# Patient Record
Sex: FEMALE
[De-identification: names, ages, dates, MRNs, and addresses within clinical notes are randomized per-mention and may not be internally consistent; named-entity substitution may affect disease eponyms.]

---

## 2021-04-20 ENCOUNTER — OFFICE VISIT (OUTPATIENT)
Age: 46
End: 2021-04-20

## 2022-06-09 ENCOUNTER — OFFICE VISIT (OUTPATIENT)
Dept: URBAN - METROPOLITAN AREA CLINIC 7 | Facility: CLINIC | Age: 47
End: 2022-06-09

## 2022-06-15 ENCOUNTER — TELEPHONE ENCOUNTER (OUTPATIENT)
Dept: URBAN - METROPOLITAN AREA CLINIC 9 | Facility: CLINIC | Age: 47
End: 2022-06-15

## 2022-06-27 ENCOUNTER — TELEPHONE ENCOUNTER (OUTPATIENT)
Dept: URBAN - METROPOLITAN AREA CLINIC 9 | Facility: CLINIC | Age: 47
End: 2022-06-27

## 2022-07-21 ENCOUNTER — TELEPHONE ENCOUNTER (OUTPATIENT)
Dept: URBAN - METROPOLITAN AREA CLINIC 9 | Facility: CLINIC | Age: 47
End: 2022-07-21

## 2022-07-30 ENCOUNTER — TELEPHONE ENCOUNTER (OUTPATIENT)
Age: 47
End: 2022-07-30

## 2022-07-31 ENCOUNTER — TELEPHONE ENCOUNTER (OUTPATIENT)
Age: 47
End: 2022-07-31

## 2022-11-15 ENCOUNTER — TELEPHONE ENCOUNTER (OUTPATIENT)
Dept: URBAN - METROPOLITAN AREA CLINIC 7 | Facility: CLINIC | Age: 47
End: 2022-11-15

## 2022-12-09 ENCOUNTER — OFFICE VISIT (OUTPATIENT)
Dept: URBAN - METROPOLITAN AREA CLINIC 7 | Facility: CLINIC | Age: 47
End: 2022-12-09

## 2022-12-14 ENCOUNTER — OFFICE VISIT (OUTPATIENT)
Dept: URBAN - METROPOLITAN AREA CLINIC 7 | Facility: CLINIC | Age: 47
End: 2022-12-14

## 2023-01-09 ENCOUNTER — LAB OUTSIDE AN ENCOUNTER (OUTPATIENT)
Dept: URBAN - METROPOLITAN AREA CLINIC 7 | Facility: CLINIC | Age: 48
End: 2023-01-09

## 2023-01-09 ENCOUNTER — OFFICE VISIT (OUTPATIENT)
Dept: URBAN - METROPOLITAN AREA CLINIC 7 | Facility: CLINIC | Age: 48
End: 2023-01-09
Payer: COMMERCIAL

## 2023-01-09 VITALS
WEIGHT: 265 LBS | DIASTOLIC BLOOD PRESSURE: 84 MMHG | SYSTOLIC BLOOD PRESSURE: 130 MMHG | BODY MASS INDEX: 44.15 KG/M2 | TEMPERATURE: 98 F | HEIGHT: 65 IN

## 2023-01-09 DIAGNOSIS — Z12.11 COLON CANCER SCREENING: ICD-10-CM

## 2023-01-09 DIAGNOSIS — K76.0 HEPATIC STEATOSIS: ICD-10-CM

## 2023-01-09 DIAGNOSIS — N81.10 BLADDER PROLAPSE, FEMALE, ACQUIRED: ICD-10-CM

## 2023-01-09 PROCEDURE — 99214 OFFICE O/P EST MOD 30 MIN: CPT | Performed by: INTERNAL MEDICINE

## 2023-01-09 NOTE — HPI-TODAY'S VISIT:
Hx  hepatic steatosis. LFTs within normal level,elevated. No risks for chronic viral hepatitis. No significant alcohol use history. No history  medication use  associated with steatosis including:amiodarone,methotrexate,tamoxifen,valproic acid ,glucocorticoids. Metabolic syndrome risk factors include elevated BMI but no impaired glycemic control/diabetes,hypertension or  hyperlipidemia. No lab,imaging or exam findings to suggest portal hypertension. No family history of liver disease. No prior liver biopsy.  Longstanding hx of gi sxs and loose BMs Negative prior eval out of state for this. Average CRC risk and due for screening colonoscopy. s/p recent surgery august with bladder sling and recotocele repair.Reports that had recent gyne fu and initiated on premarin cream to assist with suture dissolution.Has fu in a few weeks. No cardiopulmonary comorbidities . No anticoagulation. No recent nsaid use history. No fevers or chills No nausea or vomiting No weight loss No change in bowel habits No bleeding.

## 2023-02-21 PROBLEM — 305058001: Status: ACTIVE | Noted: 2023-01-09

## 2023-07-10 ENCOUNTER — TELEPHONE ENCOUNTER (OUTPATIENT)
Dept: URBAN - METROPOLITAN AREA CLINIC 7 | Facility: CLINIC | Age: 48
End: 2023-07-10

## 2023-08-18 ENCOUNTER — OFFICE VISIT (OUTPATIENT)
Dept: URBAN - METROPOLITAN AREA SURGERY CENTER 5 | Facility: SURGERY CENTER | Age: 48
End: 2023-08-18

## 2023-11-13 ENCOUNTER — TELEPHONE ENCOUNTER (OUTPATIENT)
Dept: URBAN - METROPOLITAN AREA CLINIC 7 | Facility: CLINIC | Age: 48
End: 2023-11-13

## 2023-11-15 ENCOUNTER — OFFICE VISIT (OUTPATIENT)
Dept: URBAN - METROPOLITAN AREA CLINIC 7 | Facility: CLINIC | Age: 48
End: 2023-11-15

## 2023-12-19 ENCOUNTER — TELEPHONE ENCOUNTER (OUTPATIENT)
Dept: URBAN - METROPOLITAN AREA CLINIC 7 | Facility: CLINIC | Age: 48
End: 2023-12-19

## 2023-12-19 ENCOUNTER — DASHBOARD ENCOUNTERS (OUTPATIENT)
Age: 48
End: 2023-12-19

## 2023-12-19 ENCOUNTER — OFFICE VISIT (OUTPATIENT)
Dept: URBAN - METROPOLITAN AREA CLINIC 7 | Facility: CLINIC | Age: 48
End: 2023-12-19
Payer: COMMERCIAL

## 2023-12-19 ENCOUNTER — LAB OUTSIDE AN ENCOUNTER (OUTPATIENT)
Dept: URBAN - METROPOLITAN AREA CLINIC 7 | Facility: CLINIC | Age: 48
End: 2023-12-19

## 2023-12-19 VITALS
BODY MASS INDEX: 43.32 KG/M2 | SYSTOLIC BLOOD PRESSURE: 126 MMHG | WEIGHT: 260 LBS | HEIGHT: 65 IN | TEMPERATURE: 98 F | DIASTOLIC BLOOD PRESSURE: 78 MMHG

## 2023-12-19 DIAGNOSIS — K62.3 RECTAL PROLAPSE: ICD-10-CM

## 2023-12-19 DIAGNOSIS — N81.10 BLADDER PROLAPSE, FEMALE, ACQUIRED: ICD-10-CM

## 2023-12-19 DIAGNOSIS — R79.89 ELEVATED LFTS: ICD-10-CM

## 2023-12-19 DIAGNOSIS — K76.0 HEPATIC STEATOSIS: ICD-10-CM

## 2023-12-19 PROCEDURE — 99214 OFFICE O/P EST MOD 30 MIN: CPT | Performed by: INTERNAL MEDICINE

## 2023-12-19 RX ORDER — CONJUGATED ESTROGENS 0.62 MG/G
CREAM VAGINAL
Qty: 30 GRAM | Status: ON HOLD | COMMUNITY

## 2023-12-19 RX ORDER — CONJUGATED ESTROGENS 0.62 MG/G
INSERT 1 GRAM VAGINALLY THREE TIMES WEEKLY AT BEDTIME CREAM VAGINAL
Qty: 30 UNSPECIFIED | Refills: 3 | Status: ACTIVE | COMMUNITY

## 2023-12-19 RX ORDER — GUAIFENESIN/PHENYLPROPANOLAMIN
AS DIRECTED EXPECTORANT ORAL
Status: ACTIVE | COMMUNITY

## 2023-12-19 RX ORDER — AMOXICILLIN 500 MG/1
TAKE ONE CAPSULE BY MOUTH EVERY 8 HOURS CAPSULE ORAL
Qty: 21 UNSPECIFIED | Refills: 0 | Status: ON HOLD | COMMUNITY

## 2023-12-19 NOTE — HPI-TODAY'S VISIT:
Seen back now s/p repeat fibroscan S3FO Has modifed diet and eliminated simple CHO. 10lb weight loss. [facility]. Hx  hepatic steatosis. LFTs within normal level,elevated. No risks for chronic viral hepatitis. No significant alcohol use history. No history  medication use  associated with steatosis including:amiodarone,methotrexate,tamoxifen,valproic acid ,glucocorticoids. Metabolic syndrome risk factors include elevated BMI but no impaired glycemic control/diabetes,hypertension or  hyperlipidemia. No lab,imaging or exam findings to suggest portal hypertension. No family history of liver disease. No prior liver biopsy.  Longstanding hx of gi sxs and loose BMs Negative prior eval out of state for this. Average CRC risk and due for screening colonoscopy. s/p recent surgery august with bladder sling and recotocele repair.Reports that had recent gyne fu and initiated on premarin cream to assist with suture dissolution.Has fu in a few weeks. No cardiopulmonary comorbidities . No anticoagulation. No recent nsaid use history. No fevers or chills No nausea or vomiting No weight loss No change in bowel habits No bleeding.

## 2024-04-03 ENCOUNTER — LAB (OUTPATIENT)
Dept: URBAN - METROPOLITAN AREA CLINIC 7 | Facility: CLINIC | Age: 49
End: 2024-04-03

## 2024-04-22 ENCOUNTER — COLON (OUTPATIENT)
Dept: URBAN - METROPOLITAN AREA SURGERY CENTER 5 | Facility: SURGERY CENTER | Age: 49
End: 2024-04-22
Payer: COMMERCIAL

## 2024-04-22 DIAGNOSIS — Z12.11 ENCOUNTER FOR SCREENING FOR MALIGNANT NEOPLASM OF COLON: ICD-10-CM

## 2024-04-22 DIAGNOSIS — D17.5 BENIGN LIPOMATOUS NEOPLASM OF INTRA-ABDOMINAL ORGANS: ICD-10-CM

## 2024-04-22 PROCEDURE — G0121 COLON CA SCRN NOT HI RSK IND: HCPCS | Performed by: INTERNAL MEDICINE

## 2025-07-10 ENCOUNTER — OFFICE VISIT (OUTPATIENT)
Dept: URBAN - METROPOLITAN AREA CLINIC 7 | Facility: CLINIC | Age: 50
End: 2025-07-10
Payer: COMMERCIAL

## 2025-07-10 DIAGNOSIS — K76.0 HEPATIC STEATOSIS: ICD-10-CM

## 2025-07-10 DIAGNOSIS — R79.89 ELEVATED LFTS: ICD-10-CM

## 2025-07-10 DIAGNOSIS — N81.10 BLADDER PROLAPSE, FEMALE, ACQUIRED: ICD-10-CM

## 2025-07-10 DIAGNOSIS — R68.81 EARLY SATIETY: ICD-10-CM

## 2025-07-10 DIAGNOSIS — Z86.0100 PERSONAL HISTORY OF COLONIC POLYPS: ICD-10-CM

## 2025-07-10 DIAGNOSIS — K62.3 RECTAL PROLAPSE: ICD-10-CM

## 2025-07-10 DIAGNOSIS — R19.5 LOOSE STOOLS: ICD-10-CM

## 2025-07-10 DIAGNOSIS — R63.5 WEIGHT GAIN: ICD-10-CM

## 2025-07-10 DIAGNOSIS — R14.0 ABDOMINAL BLOATING: ICD-10-CM

## 2025-07-10 PROBLEM — 197321007: Status: ACTIVE | Noted: 2025-07-10

## 2025-07-10 PROCEDURE — 99214 OFFICE O/P EST MOD 30 MIN: CPT

## 2025-07-10 RX ORDER — AMOXICILLIN 500 MG/1
TAKE ONE CAPSULE BY MOUTH EVERY 8 HOURS CAPSULE ORAL
Qty: 21 UNSPECIFIED | Refills: 0 | Status: ON HOLD | COMMUNITY

## 2025-07-10 RX ORDER — GUAIFENESIN/PHENYLPROPANOLAMIN
AS DIRECTED EXPECTORANT ORAL
Status: ACTIVE | COMMUNITY

## 2025-07-10 RX ORDER — CONJUGATED ESTROGENS 0.62 MG/G
INSERT 1 GRAM VAGINALLY THREE TIMES WEEKLY AT BEDTIME CREAM VAGINAL
Qty: 30 UNSPECIFIED | Refills: 3 | Status: ACTIVE | COMMUNITY

## 2025-07-10 RX ORDER — CONJUGATED ESTROGENS 0.62 MG/G
CREAM VAGINAL
Qty: 30 GRAM | Status: ON HOLD | COMMUNITY

## 2025-07-10 NOTE — HPI-TODAY'S VISIT:
Patient is a 49-year-old female seen today with a chronic complaint of bloating and postprandial fullness, which has persisted despite dietary changes and use of fiber supplements. Symptoms are worse after meals, and she reports dairy intolerance, which she attempts to avoid. States she feels chronic discomfort. She denies heartburn, dysphagia, nausea, vomiting, or constipation. There is no family history of IBD or colorectal cancer.   She is also being followed for hepatic steatosis and was seen today status post repeat FibroScan, which revealed S3, F0 fibrosis. LFTs have historically been elevated. She has no history of chronic viral hepatitis, significant alcohol use, or steatosis-associated medications. Risk factors for metabolic-associated fatty liver disease (MAFLD) include an elevated BMI, though she does not have diabetes, hypertension, or hyperlipidemia. There are no clinical or imaging findings suggestive of portal hypertension, and no family history of liver disease. She has not undergone liver biopsy.  Longstanding hx of gi sxs and loose BMs, with negative prior eval out of state for this. She is s/p bladder sling and recotocele repair in August 2024. No cardiopulmonary comorbidities. No anticoagulation.   Last seen in December 2023 by Dr. Leal for hepatic steatosis and elevated LFTs.  A FibroScan was ordered.  Patient counseled on weight loss and referral to [facility] was placed.  Continue milk thistle.  Testing/Procedures: -FibroScan in October 2023 revealed severe steatosis without evidence of fibrosis (S3,F0).   -Fibroscan in 2022 S3,F1. -Colonoscopy in April 2024 with Dr. Leal with 3 polyps removed.  Otherwise normal examination.  Repeat in 3 years for surveillance. Path not available.

## 2025-07-25 ENCOUNTER — TELEPHONE ENCOUNTER (OUTPATIENT)
Dept: URBAN - METROPOLITAN AREA SURGERY CENTER 5 | Facility: SURGERY CENTER | Age: 50
End: 2025-07-25

## 2025-07-25 ENCOUNTER — OFFICE VISIT (OUTPATIENT)
Dept: URBAN - METROPOLITAN AREA SURGERY CENTER 5 | Facility: SURGERY CENTER | Age: 50
End: 2025-07-25
Payer: COMMERCIAL

## 2025-07-25 DIAGNOSIS — R68.81 EARLY SATIETY: ICD-10-CM

## 2025-07-25 DIAGNOSIS — R10.13 DYSPEPSIA: ICD-10-CM

## 2025-07-25 DIAGNOSIS — K44.9 DIAPHRAGMATIC HERNIA WITHOUT OBSTRUCTION OR GANGRENE: ICD-10-CM

## 2025-07-25 PROCEDURE — 43239 EGD BIOPSY SINGLE/MULTIPLE: CPT | Performed by: INTERNAL MEDICINE

## 2025-07-25 RX ORDER — GUAIFENESIN/PHENYLPROPANOLAMIN
AS DIRECTED EXPECTORANT ORAL
Status: ACTIVE | COMMUNITY

## 2025-07-25 RX ORDER — CONJUGATED ESTROGENS 0.62 MG/G
CREAM VAGINAL
Qty: 30 GRAM | Status: ON HOLD | COMMUNITY

## 2025-07-25 RX ORDER — CONJUGATED ESTROGENS 0.62 MG/G
INSERT 1 GRAM VAGINALLY THREE TIMES WEEKLY AT BEDTIME CREAM VAGINAL
Qty: 30 UNSPECIFIED | Refills: 3 | Status: ACTIVE | COMMUNITY

## 2025-07-25 RX ORDER — AMOXICILLIN 500 MG/1
TAKE ONE CAPSULE BY MOUTH EVERY 8 HOURS CAPSULE ORAL
Qty: 21 UNSPECIFIED | Refills: 0 | Status: ON HOLD | COMMUNITY

## 2025-07-31 ENCOUNTER — LAB OUTSIDE AN ENCOUNTER (OUTPATIENT)
Dept: URBAN - METROPOLITAN AREA SURGERY CENTER 5 | Facility: SURGERY CENTER | Age: 50
End: 2025-07-31

## 2025-08-11 ENCOUNTER — TELEPHONE ENCOUNTER (OUTPATIENT)
Dept: URBAN - METROPOLITAN AREA CLINIC 23 | Facility: CLINIC | Age: 50
End: 2025-08-11

## 2025-08-14 ENCOUNTER — LAB OUTSIDE AN ENCOUNTER (OUTPATIENT)
Dept: URBAN - METROPOLITAN AREA CLINIC 7 | Facility: CLINIC | Age: 50
End: 2025-08-14

## 2025-08-21 ENCOUNTER — OFFICE VISIT (OUTPATIENT)
Dept: URBAN - METROPOLITAN AREA CLINIC 8 | Facility: CLINIC | Age: 50
End: 2025-08-21
Payer: COMMERCIAL

## 2025-08-21 DIAGNOSIS — K76.0: ICD-10-CM

## 2025-08-21 PROCEDURE — 76981 USE PARENCHYMA: CPT | Performed by: INTERNAL MEDICINE

## 2025-08-21 RX ORDER — CONJUGATED ESTROGENS 0.62 MG/G
INSERT 1 GRAM VAGINALLY THREE TIMES WEEKLY AT BEDTIME CREAM VAGINAL
Qty: 30 UNSPECIFIED | Refills: 3 | Status: ACTIVE | COMMUNITY

## 2025-08-21 RX ORDER — AMOXICILLIN 500 MG/1
TAKE ONE CAPSULE BY MOUTH EVERY 8 HOURS CAPSULE ORAL
Qty: 21 UNSPECIFIED | Refills: 0 | Status: ON HOLD | COMMUNITY

## 2025-08-21 RX ORDER — GUAIFENESIN/PHENYLPROPANOLAMIN
AS DIRECTED EXPECTORANT ORAL
Status: ACTIVE | COMMUNITY

## 2025-08-21 RX ORDER — CONJUGATED ESTROGENS 0.62 MG/G
CREAM VAGINAL
Qty: 30 GRAM | Status: ON HOLD | COMMUNITY

## (undated) LAB
ALBUMIN SERUM: (no result)
ALKALINE PHOSPHATASE: (no result)
ALT (SGPT) (ALANINE AMINO TRANSFERASE): (no result)
AST (SGOT)  (ASPART AMINO TRANSFERASE): (no result)
BILIRUBIN, TOTAL: (no result)
FERRITIN: (no result)
HEPATITIS A ANTBDY-IGG/IGM: (no result)
HEPATITIS C ANTIBODY: (no result)